# Patient Record
Sex: FEMALE | Race: WHITE
[De-identification: names, ages, dates, MRNs, and addresses within clinical notes are randomized per-mention and may not be internally consistent; named-entity substitution may affect disease eponyms.]

---

## 2019-02-06 ENCOUNTER — HOSPITAL ENCOUNTER (OUTPATIENT)
Dept: HOSPITAL 88 - MAMMO | Age: 79
End: 2019-02-06
Attending: FAMILY MEDICINE
Payer: MEDICARE

## 2019-02-06 DIAGNOSIS — Z12.31: Primary | ICD-10-CM

## 2019-02-06 PROCEDURE — 77067 SCR MAMMO BI INCL CAD: CPT

## 2019-07-29 ENCOUNTER — HOSPITAL ENCOUNTER (OUTPATIENT)
Dept: HOSPITAL 88 - US | Age: 79
End: 2019-07-29
Attending: FAMILY MEDICINE
Payer: MEDICARE

## 2019-07-29 DIAGNOSIS — E04.1: Primary | ICD-10-CM

## 2019-07-29 DIAGNOSIS — M54.5: ICD-10-CM

## 2019-07-29 PROCEDURE — 72110 X-RAY EXAM L-2 SPINE 4/>VWS: CPT

## 2019-07-29 PROCEDURE — 76536 US EXAM OF HEAD AND NECK: CPT

## 2019-07-29 NOTE — DIAGNOSTIC IMAGING REPORT
EXAMINATION:  SP LUMBAR, COMPLETE MIN 4VW    



INDICATION: Back pain



COMPARISON: Report of CT lumbar spine of 7/15/2015, images not available for

review.

     

FINDINGS:



No compression fracture. 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

centered about L2-3. Grade 1 anterolisthesis at L2-3 and L5-S1. Severe

multilevel degenerative changes with disc space narrowing, osteophyte

formation, and facet arthropathy. Surgical sutures overlie the lower pelvis at

the midline. Nonobstructive bowel gas pattern.



IMPRESSION: 

No acute fracture. Severe multilevel degenerative changes as above. Based on

comparison with the report of the CT from 7/15/2015 (images not available for

comparison) the anterolisthesis at L2-3 is likely new.



Signed by: Wendie August MD on 7/29/2019 12:02 PM

## 2019-07-29 NOTE — DIAGNOSTIC IMAGING REPORT
Thyroid ultrasound



CPT code: 29790



History:  Thyroid nodule follow-up



Comparison: None



Findings: 

The thyroid echotexture is heterogeneous.

Vascularity is normal.



The right lobe measures 3.7 x 1.1 x 2.3 cm.

The left lobe measures 3.8 x 0.8 x 1.4 cm. 

The isthmus measures 0.2 cm.



Nodules (measurements are AP, transverse, craniocaudal):  



Right Lobe: 2.0 x 0.9 x 1.6 cm hyperechoic solid, wider than tall, smooth

bordered nodule without internal calcifications previously reported to measure

2.1 x 1.2 x 1.4 cm.



Left Lobe: Hypoechoic 0.8 x 0.2 x 0.5 cm solid, wider than tall, smooth

bordered nodule at the lateral midpole without internal calcifications,

previously reported to measure 1.1 x 0.4 x 0.8 cm.



Hyperechoic 0.6 x 0.4 x 0.3 cm solid, wider than tall, smooth bordered inferior

pole nodule, previously reported to measure 0.6 x 0.5 x 0.6 cm.



Isthmus: No cystic mass or discrete solid nodule identified.



Lymph Nodes: No cervical lymph nodes are identified.



Parathyroids: Not visualized.



IMPRESSION:



1. Right thyroid 2.0 x 0.9 x 1.6 cm hyperechoic solid, wider than tall, smooth

bordered nodule without internal calcifications previously reported to measure

2.1 x 1.2 x 1.4 cm. (TI-RADS 3). 



2. Hypoechoic 0.8 x 0.2 x 0.5 cm solid, wider than tall, smooth bordered nodule

at the left thyroid lateral midpole without internal calcifications, previously

reported to measure 1.1 x 0.4 x 0.8 cm. (TI-RADS 4)



3. Hyperechoic 0.6 x 0.4 x 0.3 cm solid, wider than tall, smooth bordered left

inferior pole nodule, previously reported to measure 0.6 x 0.5 x 0.6 cm.

(TI-RADS 3)



RECOMMENDATIONS:

Recommend continued follow-up at 1, 3 and 5 years from date of initial

diagnosis).





ACR glossary of thyroid rads 



TI-RADS 1: No focal lesion. 

TI-RADS 2: Not suspicious. 

TI-RADS 3: Mildly suspicious (recommend FNA is greater than or equal to 2.5 cm;

follow-up at 1, 3, and 5 years if greater than or equal to 1.5 cm)

TI-RADS 4: 

Moderately Suspicious (recommend FNA is greater than or equal to 1.5 cm;

follow-up at 1, 2, 3, and 5 years)

TI-RADS 5: Highly suspicious (recommend FNA is greater than or equal to 10 mm)

TI-RADS 6: Biopsy-proven malignancy 

 



Signed by: Wendie August MD on 7/29/2019 12:53 PM

## 2020-05-26 ENCOUNTER — HOSPITAL ENCOUNTER (OUTPATIENT)
Dept: HOSPITAL 88 - US | Age: 80
End: 2020-05-26
Attending: FAMILY MEDICINE
Payer: MEDICARE

## 2020-05-26 DIAGNOSIS — E03.9: Primary | ICD-10-CM

## 2020-05-26 PROCEDURE — 76536 US EXAM OF HEAD AND NECK: CPT

## 2020-05-26 NOTE — DIAGNOSTIC IMAGING REPORT
Thyroid Ultrasound



Clinical Diagnosis: Hypothyroidism



Comparison: 7/29/2019

 

Technique:  

Multiple images were submitted for interpretation.  Multiple longitudinal and

transaxial images were performed with a high frequency linear transducer.



Report:



The current examination was performed by different technologist and using a

slightly different technique into evaluation and measurement of the thyroid and

the nodules. That creates difficulty in accurate comparison.



The thyroid gland is in general heterogeneous in echotexture. The vascularity

is normal.



The right lobe measures 3.1 x 1.1 x 2.0 cm. Previously it measured 3.7 x 1.1 x

2.3 cm. 



The left lobe measures 3.2 x 1.2 x 1.3 cm. Previously it measured 3.8 x 0.8 x

1.4 cm. 



The isthmus measures 2 mm. It is unchanged.



Nodules: The measurements are cc, AP, transverse.



A large nodule in the right lobe of the thyroid gland measures 20 x 9 x 14 mm.

Previously 20 x 9 x 16 mm. It is solid, hyperechoic or isoechoic, taller than

wide, smooth margins, no calcifications. It is unchanged in appearance. However

on my estimate, it is TI-RADS 4. FNA is recommended.



There is a nodule in the left lobe thyroid gland that measures 10 mm x 4 mm x 6

mm. Previously 8 x 2 x 5 mm.

It is solid, hypoechoic, taller than wide, smooth margins, no calcification. It

is TI-RADS 5. FNA is recommended.



A nodule in the left lobe of the thyroid gland measures 6 x 5 x 4 mm.

Previously 6 x 4 x 3 mm. There is almost completely solid, hyperechoic or

isoechoic, taller than wide, smooth margins, no calcification. It is TI-RADS 4.

No intervention or follow-up is recommended.



Impression:

Ultrasound of the thyroid gland with findings and recommendations as above.



Signed by: Luis Lynch MD on 5/26/2020 5:05 PM

## 2020-06-26 ENCOUNTER — HOSPITAL ENCOUNTER (OUTPATIENT)
Dept: HOSPITAL 88 - US | Age: 80
End: 2020-06-26
Attending: FAMILY MEDICINE
Payer: MEDICARE

## 2020-06-26 DIAGNOSIS — E04.1: Primary | ICD-10-CM

## 2020-06-26 PROCEDURE — 87635 SARS-COV-2 COVID-19 AMP PRB: CPT

## 2020-06-26 PROCEDURE — 88305 TISSUE EXAM BY PATHOLOGIST: CPT

## 2020-06-26 PROCEDURE — 88172 CYTP DX EVAL FNA 1ST EA SITE: CPT

## 2020-06-26 PROCEDURE — 10005 FNA BX W/US GDN 1ST LES: CPT

## 2020-06-26 PROCEDURE — 88173 CYTOPATH EVAL FNA REPORT: CPT

## 2020-06-26 NOTE — DIAGNOSTIC IMAGING REPORT
PROCEDURE: Ultrasound-guided thyroid FNA



Procedural Personnel

Attending physician(s): Wendie August MD

Fellow physician(s): None

Resident physician(s): None

Advanced practice provider(s): None



Pre-procedure diagnosis: Right thyroid nodule

Post-procedure diagnosis: Same

Indication: Histopathologic diagnosis

Previous biopsy of same target (QCDR): No

Additional clinical history: None



Complications: No immediate complications.



IMPRESSION:



Ultrasound-guided biopsy of right thyroid nodule.



Plan: 



Specimen(s) sent for evaluation.

_______________________________________________________________



PROCEDURE SUMMARY:

- Percutaneous US-guided right thyroid biopsy

- Additional procedure(s): None



PROCEDURE DETAILS:



Pre-procedure

Reference imaging for biopsy target: Thyroid ultrasound 5/26/2020

Consent: Informed consent for the procedure including risks, benefits and

alternatives was obtained and time-out was performed prior to the procedure.

Preparation: The site was prepared and draped using maximal sterile barrier

technique including cutaneous antisepsis.



Anesthesia/sedation

Level of anesthesia/sedation: No sedation

Anesthesia/sedation administered by: Independent trained observer under

attending supervision with continuous monitoring of the patient?s level of

consciousness and physiologic status

Total intra-service sedation time (minutes): NA



Imaging prior to biopsy

The patient was positioned supine. Initial ultrasound was performed.

Biopsy target:

- Maximal diameter (cm): 2

- Location: Right thyroid

Other findings: None



Biopsy 

Local anesthesia was administered. Under US guidance, the biopsy needle was

advanced to the target and biopsy was performed.

Coaxial needle: None



Fine needle aspiration device: Chiba

Fine needle size: 22g

Number of FNA specimens: 4



On-site biopsy touch preparation: Yes  

Additional sampling recommendations: None

Preliminary assessment of sample adequacy: Adequate



Needle removal

The biopsy needle was removed and a sterile dressing was applied.

Tract embolization: None



Imaging following biopsy

Immediate post-biopsy ultrasound was performed.

Post-biopsy imaging findings: No hematoma



Additional Details

Additional description of procedure: None

Equipment details: None

Specimens removed: Biopsy samples as detailed above

Estimated blood loss (mL): Less than 10

Standardized report: [id number]



Attestation

Signer name: Wendie August MD

I attest that I was present for the entire procedure. I reviewed the stored

images and agree with the report as written.



Signed by: Wendie August MD on 6/26/2020 3:05 PM

## 2021-10-12 ENCOUNTER — HOSPITAL ENCOUNTER (OUTPATIENT)
Dept: HOSPITAL 88 - US | Age: 81
End: 2021-10-12
Attending: FAMILY MEDICINE
Payer: MEDICARE

## 2021-10-12 DIAGNOSIS — E04.1: Primary | ICD-10-CM

## 2021-10-12 PROCEDURE — 76536 US EXAM OF HEAD AND NECK: CPT

## 2022-10-14 ENCOUNTER — HOSPITAL ENCOUNTER (OUTPATIENT)
Dept: HOSPITAL 88 - US | Age: 82
End: 2022-10-14
Attending: NURSE PRACTITIONER
Payer: MEDICARE

## 2022-10-14 DIAGNOSIS — E04.1: Primary | ICD-10-CM

## 2022-10-14 PROCEDURE — 76536 US EXAM OF HEAD AND NECK: CPT
